# Patient Record
Sex: FEMALE | Race: WHITE | URBAN - METROPOLITAN AREA
[De-identification: names, ages, dates, MRNs, and addresses within clinical notes are randomized per-mention and may not be internally consistent; named-entity substitution may affect disease eponyms.]

---

## 2019-04-29 ENCOUNTER — OFFICE VISIT (OUTPATIENT)
Dept: URGENT CARE | Facility: URGENT CARE | Age: 24
End: 2019-04-29
Payer: COMMERCIAL

## 2019-04-29 VITALS
HEIGHT: 67 IN | DIASTOLIC BLOOD PRESSURE: 68 MMHG | RESPIRATION RATE: 12 BRPM | TEMPERATURE: 98.1 F | SYSTOLIC BLOOD PRESSURE: 120 MMHG | BODY MASS INDEX: 23.54 KG/M2 | WEIGHT: 150 LBS | HEART RATE: 78 BPM

## 2019-04-29 DIAGNOSIS — S61.011A LACERATION OF RIGHT THUMB WITHOUT FOREIGN BODY WITHOUT DAMAGE TO NAIL, INITIAL ENCOUNTER: Primary | ICD-10-CM

## 2019-04-29 PROCEDURE — 99213 OFFICE O/P EST LOW 20 MIN: CPT | Performed by: PHYSICIAN ASSISTANT

## 2019-04-29 ASSESSMENT — MIFFLIN-ST. JEOR: SCORE: 1468.03

## 2019-04-29 NOTE — PROGRESS NOTES
SUBJECTIVE:   Shani Canseco is a 23 year old female presenting with a chief complaint of   Chief Complaint   Patient presents with     Urgent Care     Laceration     cut to right thumb about 2:45pm today. cut on glass       She is an established patient of [facility].    Laceration    Mechanism of injury: was working on art project with broken mirror, accidentally cut right thumb with a piece of it.  History provided by: Patient  Time of injury was 3 hours(s) ago.    This is a non-work related, self-inflicted and accidental injury.    Associated symptoms: Denies numbness, weakness, or loss of function    Last tetanus booster within 10 years: Yes    LACERATION EXAM:   Size of laceration: 1 centimeters  Characteristics of the laceration: clean and linear  Depth of laceration: superficial  Tendon function intact: Yes  Sensation to light touch intact: Yes  Pulses/capillary refill intact: Yes  Foreign body: No    Picture included in patient's chart: no    PROCEDURE NOTE:  Anesthesia: None  Prepped and draped in the usual sterile fashion  Wound irrigated with sterile water  Wound was explored for any foreign bodies and evaluated for tendon, nerve, vessel or joint involvement.    Closure was with gel foam  Laceration was closed with gel foam  Bandage was applied  Patient tolerated the procedure well    Review of Systems   ROS: 10 point ROS neg other than the symptoms noted above in the HPI.    Past Medical History:   Diagnosis Date     NO ACTIVE PROBLEMS      No family history on file.  Current Outpatient Medications   Medication Sig Dispense Refill     levonorgestrel (MIRENA) 20 MCG/24HR IUD 1 each by Intrauterine route once       Ibuprofen (ADVIL PO)        UNKNOWN TO PATIENT        Social History     Tobacco Use     Smoking status: Never Smoker     Smokeless tobacco: Never Used   Substance Use Topics     Alcohol use: Yes     Alcohol/week: 0.0 oz       OBJECTIVE  /68   Pulse 78   Temp 98.1  F (36.7  C) (Oral)    "Resp 12   Ht 1.702 m (5' 7\")   Wt 68 kg (150 lb)   Breastfeeding? No   BMI 23.49 kg/m      Physical Exam   Constitutional: She is oriented to person, place, and time. She appears well-developed and well-nourished. No distress.   HENT:   Head: Normocephalic and atraumatic.   Eyes: EOM are normal.   Neck: Normal range of motion.   Pulmonary/Chest: Effort normal.   Musculoskeletal: Normal range of motion. She exhibits no edema or deformity.        Hands:  Neurological: She is alert and oriented to person, place, and time. No sensory deficit.   Skin: Skin is warm and dry. Capillary refill takes less than 2 seconds.   Psychiatric: She has a normal mood and affect. Her behavior is normal.   Nursing note and vitals reviewed.    Labs:  No results found for this or any previous visit (from the past 24 hour(s)).    X-Ray was not done.    ASSESSMENT:      ICD-10-CM    1. Laceration of right thumb without foreign body without damage to nail, initial encounter S61.011A         Medical Decision Making:    Differential Diagnosis:  Laceration without foreign body, laceration with foreign body, abrasion, contusion    Serious Comorbid Conditions:  Adult:  None    PLAN:    Laceration:    Keep wound clean and dry for the next 24-48 hours  Ok to shower and get wound wet after 48 hours, but do not soak for 2 weeks  Wound follow-up: keep gel foam in place, it will fall out on its own   May return to work/school as long as wound is kept clean and dry  Discussed the probability of scarring  Active range of motion exercises encouraged for finger lacerations  Patient will return immediately if they experience redness around the laceration, drainage, worsening pain, or fever.        Followup:    If not improving or if condition worsens, follow up with your Primary Care Provider    Shen Shepard PA-C  "